# Patient Record
Sex: MALE
[De-identification: names, ages, dates, MRNs, and addresses within clinical notes are randomized per-mention and may not be internally consistent; named-entity substitution may affect disease eponyms.]

---

## 2018-07-08 ENCOUNTER — HOSPITAL ENCOUNTER (EMERGENCY)
Dept: HOSPITAL 5 - ED | Age: 63
Discharge: LEFT BEFORE DISCHARGE | End: 2018-07-08
Payer: COMMERCIAL

## 2018-07-08 VITALS — DIASTOLIC BLOOD PRESSURE: 77 MMHG | SYSTOLIC BLOOD PRESSURE: 145 MMHG

## 2018-07-08 DIAGNOSIS — N48.30: Primary | ICD-10-CM

## 2018-07-08 DIAGNOSIS — Z88.2: ICD-10-CM

## 2018-07-08 LAB
BASOPHILS # (AUTO): 0 K/MM3 (ref 0–0.1)
BASOPHILS NFR BLD AUTO: 0.4 % (ref 0–1.8)
BILIRUB UR QL STRIP: (no result)
BLOOD UR QL VISUAL: (no result)
BUN SERPL-MCNC: 14 MG/DL (ref 9–20)
BUN/CREAT SERPL: 16 %
CALCIUM SERPL-MCNC: 9.1 MG/DL (ref 8.4–10.2)
EOSINOPHIL # BLD AUTO: 0 K/MM3 (ref 0–0.4)
EOSINOPHIL NFR BLD AUTO: 1 % (ref 0–4.3)
HCT VFR BLD CALC: 49.6 % (ref 35.5–45.6)
HEMOLYSIS INDEX: 12
HGB BLD-MCNC: 16.7 GM/DL (ref 11.8–15.2)
LYMPHOCYTES # BLD AUTO: 1 K/MM3 (ref 1.2–5.4)
LYMPHOCYTES NFR BLD AUTO: 25.3 % (ref 13.4–35)
MCH RBC QN AUTO: 31 PG (ref 28–32)
MCHC RBC AUTO-ENTMCNC: 34 % (ref 32–34)
MCV RBC AUTO: 93 FL (ref 84–94)
MONOCYTES # (AUTO): 0.4 K/MM3 (ref 0–0.8)
MONOCYTES % (AUTO): 9.8 % (ref 0–7.3)
PH UR STRIP: 7 [PH] (ref 5–7)
PLATELET # BLD: 114 K/MM3 (ref 140–440)
PROT UR STRIP-MCNC: (no result) MG/DL
RBC # BLD AUTO: 5.34 M/MM3 (ref 3.65–5.03)
RBC #/AREA URNS HPF: 2 /HPF (ref 0–6)
UROBILINOGEN UR-MCNC: < 2 MG/DL (ref ?–2)
WBC #/AREA URNS HPF: 1 /HPF (ref 0–6)

## 2018-07-08 PROCEDURE — 99283 EMERGENCY DEPT VISIT LOW MDM: CPT

## 2018-07-08 PROCEDURE — 96374 THER/PROPH/DIAG INJ IV PUSH: CPT

## 2018-07-08 PROCEDURE — 96361 HYDRATE IV INFUSION ADD-ON: CPT

## 2018-07-08 PROCEDURE — 93005 ELECTROCARDIOGRAM TRACING: CPT

## 2018-07-08 PROCEDURE — 81001 URINALYSIS AUTO W/SCOPE: CPT

## 2018-07-08 PROCEDURE — 85025 COMPLETE CBC W/AUTO DIFF WBC: CPT

## 2018-07-08 PROCEDURE — 80048 BASIC METABOLIC PNL TOTAL CA: CPT

## 2018-07-08 PROCEDURE — 54220 IRRG CRPRA CAVRNOSA PRIAPISM: CPT

## 2018-07-08 PROCEDURE — 84484 ASSAY OF TROPONIN QUANT: CPT

## 2018-07-08 PROCEDURE — 36415 COLL VENOUS BLD VENIPUNCTURE: CPT

## 2018-07-08 PROCEDURE — 93010 ELECTROCARDIOGRAM REPORT: CPT

## 2018-07-08 RX ADMIN — PSEUDOEPHEDRINE HCL ONE MG: 30 TABLET, FILM COATED ORAL at 11:17

## 2018-07-08 RX ADMIN — PHENYLEPHRINE HYDROCHLORIDE NR MG: 10 INJECTION INTRAVENOUS at 12:02

## 2018-07-08 RX ADMIN — PHENYLEPHRINE HYDROCHLORIDE NR MG: 10 INJECTION INTRAVENOUS at 13:49

## 2018-07-08 RX ADMIN — PSEUDOEPHEDRINE HCL ONE: 30 TABLET, FILM COATED ORAL at 12:01

## 2018-07-08 NOTE — EMERGENCY DEPARTMENT REPORT
ED Male  HPI





- General


Chief complaint: Urogenital-Male


Stated complaint: TRIMIX INJECTION


Time Seen by Provider: 07/08/18 10:57


Source: patient


Mode of arrival: Ambulatory


Limitations: No Limitations





- History of Present Illness


Initial comments: 





Mr Calloway is a 63 year-old man with hx of diet controlled HTN and DM who 

presents from home with erection. Used trimix last night around 11pm. has had 

an erection since. Tried icing it, tried taking a walk. Remains with an 

erection. Mildly painful. Sensation intact. No other complaints. Never used 

trimix before. No previous episodes of priapism. Had brief episode of light 

headedness while sitting this morning. No chest pain. no shortness of breath. 

No testicular pain. 





- Related Data


 Allergies











Allergy/AdvReac Type Severity Reaction Status Date / Time


 


Sulfa (Sulfonamide Allergy  Hives Verified 07/08/18 10:50





Antibiotics)     














ED Review of Systems


ROS: 


Stated complaint: TRIMIX INJECTION


Other details as noted in HPI





Comment: All other systems reviewed and negative





ED Past Medical Hx





- Past Medical History


Previous Medical History?: No





- Surgical History


Past Surgical History?: No





- Social History


Smoking Status: Never Smoker


Substance Use Type: None





ED Physical Exam





- General


Limitations: No Limitations


General appearance: alert, in no apparent distress





- Head


Head exam: Present: atraumatic, normocephalic





- Eye


Eye exam: Present: normal appearance, PERRL.  Absent: conjunctival injection





- ENT


ENT exam: Present: normal exam, mucous membranes moist





- Neck


Neck exam: Present: normal inspection





- Respiratory


Respiratory exam: Present: normal lung sounds bilaterally.  Absent: respiratory 

distress





- Cardiovascular


Cardiovascular Exam: Present: regular rate, normal rhythm





- GI/Abdominal


GI/Abdominal exam: Present: soft.  Absent: distended, tenderness, guarding, 

rebound





- 


 exam: Present: other (erection, normal sensation, normal color).  Absent: 

testicular tenderness, urethral discharge, scrotal swelling


External exam: Absent: erythema, lesions, lacerations, ecchymosis





- Extremities Exam


Extremities exam: Present: normal inspection.  Absent: tenderness





- Back Exam


Back exam: Present: normal inspection.  Absent: CVA tenderness (R), CVA 

tenderness (L)





- Neurological Exam


Neurological exam: Present: alert, oriented X3





- Psychiatric


Psychiatric exam: Present: normal affect, normal mood





- Skin


Skin exam: Present: warm, dry, intact





ED Course


 Vital Signs











  07/08/18 07/08/18 07/08/18





  10:50 10:56 11:00


 


Temperature 97.5 F L  


 


Pulse Rate 74  67


 


Respiratory 18  13





Rate   


 


Blood Pressure 165/79  


 


Blood Pressure   





[Right]   


 


O2 Sat by Pulse 97 97 98





Oximetry   














  07/08/18 07/08/18 07/08/18





  11:16 11:30 11:45


 


Temperature   


 


Pulse Rate 57 L 47 L 59 L


 


Respiratory 12 14 17





Rate   


 


Blood Pressure 167/76 167/76 156/81


 


Blood Pressure   





[Right]   


 


O2 Sat by Pulse 98 96 98





Oximetry   














  07/08/18 07/08/18 07/08/18





  11:58 12:00 12:13


 


Temperature   98.8 F


 


Pulse Rate   72


 


Respiratory 18 15 18





Rate   


 


Blood Pressure  172/91 


 


Blood Pressure   148/68





[Right]   


 


O2 Sat by Pulse 98 98 96





Oximetry   














  07/08/18 07/08/18 07/08/18





  12:16 12:30 12:46


 


Temperature   


 


Pulse Rate 59 L 74 78


 


Respiratory 13 20 15





Rate   


 


Blood Pressure 172/91 172/91 172/91


 


Blood Pressure   





[Right]   


 


O2 Sat by Pulse 98 96 95





Oximetry   














  07/08/18 07/08/18 07/08/18





  13:00 13:16 13:30


 


Temperature   


 


Pulse Rate 78 76 82


 


Respiratory 22 16 20





Rate   


 


Blood Pressure 161/90 161/90 161/90


 


Blood Pressure   





[Right]   


 


O2 Sat by Pulse 95 96 96





Oximetry   














  07/08/18 07/08/18 07/08/18





  13:46 14:00 14:16


 


Temperature   


 


Pulse Rate 89 81 79


 


Respiratory 19 19 16





Rate   


 


Blood Pressure 161/90 144/79 144/79


 


Blood Pressure   





[Right]   


 


O2 Sat by Pulse 98 95 96





Oximetry   














  07/08/18 07/08/18 07/08/18





  14:30 14:46 15:00


 


Temperature   


 


Pulse Rate 89 75 82


 


Respiratory 14 14 18





Rate   


 


Blood Pressure 144/79 144/79 145/77


 


Blood Pressure   





[Right]   


 


O2 Sat by Pulse 90 98 95





Oximetry   














  07/08/18 07/08/18 07/08/18





  15:16 15:30 15:53


 


Temperature   98 F


 


Pulse Rate 83 77 77


 


Respiratory 17 17 18





Rate   


 


Blood Pressure 145/77 145/77 


 


Blood Pressure   145/77





[Right]   


 


O2 Sat by Pulse 97 97 97





Oximetry   














- Penile Procedure


Consent Obtained: verbal consent


Time Out Performed: Yes


Indication: priapism management


Procedural Sedation: No


Sedation/Analgesia: benzodiazepines


Local Anesthesia Used: Penile Nerve Block


Amount of Anesthesia Used (mls): 3


Reduction of Paraphimosis: other


Priapism Management: aspiration, phenylephrine injection


Complications: other (bruising)


Patient Tolerated Procedure: well


Additional Comments: 





dorsal penile block. Aspirated 3 times and phenylephine injected x3. 800mcg 

total. able to aspirate blood, 5ml, 20mg, and then 25ml. However blood 

immediately reaccumulates. Given 60mg sudafed as well. 





While awaiting return call from urology, drained penis again. 20mL blood 

removed with 20g needle. Penis now soft again. 








ED Medical Decision Making





- Lab Data


Result diagrams: 


 07/08/18 11:24





 07/08/18 11:24








 Lab Results











  07/08/18 07/08/18 07/08/18 Range/Units





  11:24 11:24 12:23 


 


WBC  4.0 L    (4.5-11.0)  K/mm3


 


RBC  5.34 H    (3.65-5.03)  M/mm3


 


Hgb  16.7 H    (11.8-15.2)  gm/dl


 


Hct  49.6 H    (35.5-45.6)  %


 


MCV  93    (84-94)  fl


 


MCH  31    (28-32)  pg


 


MCHC  34    (32-34)  %


 


RDW  13.5    (13.2-15.2)  %


 


Plt Count  114 L    (140-440)  K/mm3


 


Lymph % (Auto)  25.3    (13.4-35.0)  %


 


Mono % (Auto)  9.8 H    (0.0-7.3)  %


 


Eos % (Auto)  1.0    (0.0-4.3)  %


 


Baso % (Auto)  0.4    (0.0-1.8)  %


 


Lymph #  1.0 L    (1.2-5.4)  K/mm3


 


Mono #  0.4    (0.0-0.8)  K/mm3


 


Eos #  0.0    (0.0-0.4)  K/mm3


 


Baso #  0.0    (0.0-0.1)  K/mm3


 


Seg Neutrophils %  63.5    (40.0-70.0)  %


 


Seg Neutrophils #  2.5    (1.8-7.7)  K/mm3


 


Sodium   132 L   (137-145)  mmol/L


 


Potassium   4.2   (3.6-5.0)  mmol/L


 


Chloride   95.4 L   ()  mmol/L


 


Carbon Dioxide   23   (22-30)  mmol/L


 


Anion Gap   18   mmol/L


 


BUN   14   (9-20)  mg/dL


 


Creatinine   0.9   (0.8-1.5)  mg/dL


 


Estimated GFR   > 60   ml/min


 


BUN/Creatinine Ratio   16   %


 


Glucose   215 H   ()  mg/dL


 


Calcium   9.1   (8.4-10.2)  mg/dL


 


Troponin T   < 0.010   (0.00-0.029)  ng/mL


 


Urine Color    Yellow  (Yellow)  


 


Urine Turbidity    Clear  (Clear)  


 


Urine pH    7.0  (5.0-7.0)  


 


Ur Specific Gravity    1.008  (1.003-1.030)  


 


Urine Protein    <15 mg/dl  (Negative)  mg/dL


 


Urine Glucose (UA)    50  (Negative)  mg/dL


 


Urine Ketones    Tr  (Negative)  mg/dL


 


Urine Blood    Neg  (Negative)  


 


Urine Nitrite    Neg  (Negative)  


 


Urine Bilirubin    Neg  (Negative)  


 


Urine Urobilinogen    < 2.0  (<2.0)  mg/dL


 


Ur Leukocyte Esterase    Neg  (Negative)  


 


Urine WBC (Auto)    1.0  (0.0-6.0)  /HPF


 


Urine RBC (Auto)    2.0  (0.0-6.0)  /HPF














- EKG Data





07/08/18 11:41


HR 54, sinus, normal axis, intervals wnl, no ST changes concerning for acute 

ischemia 








- Medical Decision Making





Mr Calloway is a 63 year-old man who presents with priapism. On cialis 10mg daily. 

used trimix (injectable alprostadil, papaverine, phentolamine) last night 

around 11pm. Persistent erection. Sensation intact on exam. Erect penis on 

arrival. Given ice packs and 60mg sudafed without relief. Dorsal penile block 

with aspiration and injection of intracorporal phenylephine x 3. Able to 

apirate large volume of blood, penis becomes flacid but immediately 

reaccumulates, even with pressure bandage. Sending to [facility] for urologic 

consult. No urology coverage available here. Prior to transfer, some decrease 

in tone, however with erection for 16 hours, i would recommend urgent urologic 

evaluation. Still not flaccid. Transfer to [facility]. Accepted by ED Physician MD Jessie.





Reported episode of light headedness. Given IVF with full resolutions of 

symptoms. Mild dehydration on labs. EKG non-ischemic with normal intervals. 

Trop neg. UA clean. 





Patient is now refusing transport. Would like to sign out AMA. Paperwork 

signed. understands risks. Is now flaccid. Given urology number for follow-up. 

Given strict return precautions. AMA


Critical care attestation.: 


If time is entered above; I have spent that time in minutes in the direct care 

of this critically ill patient, excluding procedure time.








ED Disposition


Clinical Impression: 


 Priapism





Disposition: DC-07 LEFT AGAINST MED ADVICE


Is pt being admited?: No


Condition: Stable


Instructions:  Priapism (ED)


Referrals: 


STONE SHIN MD [Staff Physician] - 24 Hours (Call tomorrow for close 

follow-up)